# Patient Record
Sex: MALE
[De-identification: names, ages, dates, MRNs, and addresses within clinical notes are randomized per-mention and may not be internally consistent; named-entity substitution may affect disease eponyms.]

---

## 2020-01-24 PROBLEM — Z00.00 ENCOUNTER FOR PREVENTIVE HEALTH EXAMINATION: Status: ACTIVE | Noted: 2020-01-24

## 2020-01-27 ENCOUNTER — APPOINTMENT (OUTPATIENT)
Dept: OTOLARYNGOLOGY | Facility: CLINIC | Age: 60
End: 2020-01-27

## 2023-11-12 ENCOUNTER — VIRTUAL VISIT (OUTPATIENT)
Dept: URGENT CARE | Facility: CLINIC | Age: 63
End: 2023-11-12

## 2023-11-12 DIAGNOSIS — U07.1 COVID-19: Primary | ICD-10-CM

## 2023-11-12 PROCEDURE — 99203 OFFICE O/P NEW LOW 30 MIN: CPT | Mod: 95

## 2023-11-12 NOTE — PROGRESS NOTES
Brennen is a 62 year old who is being evaluated via a billable telephone visit.      What phone number would you like to be contacted at? [phone]  How would you like to obtain your AVS? Antwan    Distant Location (provider location):  Off-site    Assessment & Plan     (U07.1) COVID-19  (primary encounter diagnosis)    Plan: molnupiravir (LAGEVRIO) 200 MG capsule      ROLANDO Siddiqi CNP  Virtual Urgent Care  [facility] VIRTUAL URGENT CARE    Subjective   Brennen is a 62 year old, presenting for the following health issues:  COVID TX    HPI   Dx COVID at home today  Sx started last night and consist of  sore throat cough congestion  No sob wheezing chest pain  Hydrated   Has prostate cancer        Objective           Vitals:  No vitals were obtained today due to virtual visit.    Physical Exam   healthy, alert, and no distress  PSYCH: Alert and oriented times 3; coherent speech, normal   rate and volume, able to articulate logical thoughts, able   to abstract reason, no tangential thoughts, no hallucinations   or delusions  His affect is normal  RESP: No cough, no audible wheezing, able to talk in full sentences  Remainder of exam unable to be completed due to telephone visits      Phone call duration: 22 minutes

## 2023-12-31 ENCOUNTER — HEALTH MAINTENANCE LETTER (OUTPATIENT)
Age: 63
End: 2023-12-31

## 2025-01-19 ENCOUNTER — HEALTH MAINTENANCE LETTER (OUTPATIENT)
Age: 65
End: 2025-01-19